# Patient Record
Sex: FEMALE | Race: WHITE | NOT HISPANIC OR LATINO | URBAN - METROPOLITAN AREA
[De-identification: names, ages, dates, MRNs, and addresses within clinical notes are randomized per-mention and may not be internally consistent; named-entity substitution may affect disease eponyms.]

---

## 2018-03-27 ENCOUNTER — EMERGENCY (EMERGENCY)
Facility: HOSPITAL | Age: 48
LOS: 1 days | Discharge: HOME | End: 2018-03-27
Admitting: EMERGENCY MEDICINE
Payer: COMMERCIAL

## 2018-03-27 VITALS
HEART RATE: 85 BPM | SYSTOLIC BLOOD PRESSURE: 105 MMHG | RESPIRATION RATE: 16 BRPM | DIASTOLIC BLOOD PRESSURE: 83 MMHG | OXYGEN SATURATION: 100 % | TEMPERATURE: 98 F

## 2018-03-27 PROCEDURE — 99283 EMERGENCY DEPT VISIT LOW MDM: CPT

## 2018-03-27 RX ORDER — DIAZEPAM 5 MG
1 TABLET ORAL
Qty: 21 | Refills: 0 | OUTPATIENT
Start: 2018-03-27 | End: 2018-04-02

## 2018-03-27 RX ORDER — ACETAMINOPHEN 500 MG
650 TABLET ORAL ONCE
Qty: 0 | Refills: 0 | Status: COMPLETED | OUTPATIENT
Start: 2018-03-27 | End: 2018-03-27

## 2018-03-27 RX ADMIN — Medication 650 MILLIGRAM(S): at 22:07

## 2018-03-27 NOTE — ED PROVIDER NOTE - PLAN OF CARE
Follow up with your PMD within 48-72 hours. Take Tylenol 650mg every 4-6 hours as needed. Take Valium 5mg every 8hrs as needed for muscle spasm- caution drowsiness when taking this medication. Do not drive or operate heavy machinery. Proceed with the MRI as scheduled and follow up with your Oncologist within the week.  Worsening, continued or ANY new concerning symptoms return to the emergency department.

## 2018-03-27 NOTE — ED PROVIDER NOTE - MEDICAL DECISION MAKING DETAILS
48 y/o F with h/o Breast CA presenting with intermittent headaches tension like in nature scheduled for an MRI this Monday, no Neuro deficits, no evidence of intracranial bleed or stroke- Valium, Tylenol, Warm compresses to shoulder/neck, reassess, Oncology/PMD follow up

## 2018-03-27 NOTE — ED PROVIDER NOTE - PROGRESS NOTE DETAILS
EBONI Liu- pt feeling much better s/p Valium and Tylenol. States she feels tension relieved in the back of her neck and shoulders which lessoned the headache.  Discussing the risks and benefits of a CT head with radiation exposure- based on a normal Neurological exam, improvement s/p meds and an MRI already scheduled within the week, through shared decision making a CT head not ordered. Patient understands that she can come back to the ED at any time for continued, worsening or new concerning symptoms.

## 2018-03-27 NOTE — ED ADULT TRIAGE NOTE - CHIEF COMPLAINT QUOTE
Pt. c/o headaches x 3 weeks is scheduled for MRI on Monday to r/o Brain mets. Hx of b/l Breast CA in 2015 that went in to remission. Denies visual changes or dizziness.

## 2018-03-27 NOTE — ED PROVIDER NOTE - OBJECTIVE STATEMENT
46 y/o F with h/o Breast CA (remission) presenting with intermittent headaches x 3 weeks. Describes them as bandlike, aching starting from shoulders into neck into head wrapping around.  Denies fever, chills, visual change, trouble walking, trouble speaking, nausea, vomiting, extremity weakness, numbness. States she is under a lot of stress at work. Saw her Oncologist Adam Young who ordered an MRI brain to r/o mets- scheduled for Monday. patient came to ED hoping she could get it here sooner.  Taking Advil, Excedrin, and Tylenol with mild relief. Appears very well. No distress.   meds: Synthroid  NKDA  (+) former smoker quit 2013 46 y/o F with h/o Breast CA (remission) presenting with intermittent headaches x 3 weeks. Describes them as bandlike, aching starting from shoulders into neck into head wrapping around.  Denies fever, chills, visual change, trouble walking, trouble speaking, nausea, vomiting, extremity weakness, numbness. States she is under a lot of stress at work. Saw her Oncologist Adam Young who ordered an MRI brain to r/o mets- scheduled for this Monday. Patient came to ED hoping she could get it here sooner. States the pain is not worse than it has been. Taking Advil, Excedrin, and Tylenol with mild relief. Appears very well. No distress.   meds: Synthroid  NKDA  (+) former smoker quit 2013

## 2018-03-27 NOTE — ED PROVIDER NOTE - CARE PLAN
Principal Discharge DX:	Headache  Assessment and plan of treatment:	Follow up with your PMD within 48-72 hours. Take Tylenol 650mg every 4-6 hours as needed. Take Valium 5mg every 8hrs as needed for muscle spasm- caution drowsiness when taking this medication. Do not drive or operate heavy machinery. Proceed with the MRI as scheduled and follow up with your Oncologist within the week.  Worsening, continued or ANY new concerning symptoms return to the emergency department.

## 2018-09-29 ENCOUNTER — EMERGENCY (EMERGENCY)
Facility: HOSPITAL | Age: 48
LOS: 1 days | Discharge: HOME | End: 2018-09-29
Attending: EMERGENCY MEDICINE | Admitting: EMERGENCY MEDICINE
Payer: COMMERCIAL

## 2018-09-29 VITALS
HEART RATE: 70 BPM | SYSTOLIC BLOOD PRESSURE: 109 MMHG | RESPIRATION RATE: 18 BRPM | DIASTOLIC BLOOD PRESSURE: 68 MMHG | OXYGEN SATURATION: 96 % | TEMPERATURE: 98 F

## 2018-09-29 VITALS
SYSTOLIC BLOOD PRESSURE: 110 MMHG | DIASTOLIC BLOOD PRESSURE: 82 MMHG | OXYGEN SATURATION: 95 % | RESPIRATION RATE: 16 BRPM | TEMPERATURE: 98 F | HEART RATE: 78 BPM

## 2018-09-29 PROBLEM — C50.919 MALIGNANT NEOPLASM OF UNSPECIFIED SITE OF UNSPECIFIED FEMALE BREAST: Chronic | Status: ACTIVE | Noted: 2018-03-27

## 2018-09-29 LAB
ALBUMIN SERPL ELPH-MCNC: 4.4 G/DL — SIGNIFICANT CHANGE UP (ref 3.3–5)
ALP SERPL-CCNC: 77 U/L — SIGNIFICANT CHANGE UP (ref 40–120)
ALT FLD-CCNC: 33 U/L — SIGNIFICANT CHANGE UP (ref 4–33)
AST SERPL-CCNC: 35 U/L — HIGH (ref 4–32)
BASE EXCESS BLDV CALC-SCNC: 1.3 MMOL/L — SIGNIFICANT CHANGE UP
BASOPHILS # BLD AUTO: 0.03 K/UL — SIGNIFICANT CHANGE UP (ref 0–0.2)
BASOPHILS NFR BLD AUTO: 0.4 % — SIGNIFICANT CHANGE UP (ref 0–2)
BILIRUB SERPL-MCNC: 0.6 MG/DL — SIGNIFICANT CHANGE UP (ref 0.2–1.2)
BLOOD GAS VENOUS - CREATININE: 0.69 MG/DL — SIGNIFICANT CHANGE UP (ref 0.5–1.3)
BUN SERPL-MCNC: 11 MG/DL — SIGNIFICANT CHANGE UP (ref 7–23)
CALCIUM SERPL-MCNC: 9.4 MG/DL — SIGNIFICANT CHANGE UP (ref 8.4–10.5)
CHLORIDE BLDV-SCNC: 108 MMOL/L — SIGNIFICANT CHANGE UP (ref 96–108)
CHLORIDE SERPL-SCNC: 102 MMOL/L — SIGNIFICANT CHANGE UP (ref 98–107)
CO2 SERPL-SCNC: 22 MMOL/L — SIGNIFICANT CHANGE UP (ref 22–31)
CREAT SERPL-MCNC: 0.78 MG/DL — SIGNIFICANT CHANGE UP (ref 0.5–1.3)
EOSINOPHIL # BLD AUTO: 0.13 K/UL — SIGNIFICANT CHANGE UP (ref 0–0.5)
EOSINOPHIL NFR BLD AUTO: 1.9 % — SIGNIFICANT CHANGE UP (ref 0–6)
GAS PNL BLDV: 138 MMOL/L — SIGNIFICANT CHANGE UP (ref 136–146)
GLUCOSE BLDV-MCNC: 86 — SIGNIFICANT CHANGE UP (ref 70–99)
GLUCOSE SERPL-MCNC: 84 MG/DL — SIGNIFICANT CHANGE UP (ref 70–99)
HCO3 BLDV-SCNC: 25 MMOL/L — SIGNIFICANT CHANGE UP (ref 20–27)
HCT VFR BLD CALC: 40.7 % — SIGNIFICANT CHANGE UP (ref 34.5–45)
HCT VFR BLDV CALC: 43.6 % — SIGNIFICANT CHANGE UP (ref 34.5–45)
HGB BLD-MCNC: 13.4 G/DL — SIGNIFICANT CHANGE UP (ref 11.5–15.5)
HGB BLDV-MCNC: 14.2 G/DL — SIGNIFICANT CHANGE UP (ref 11.5–15.5)
IMM GRANULOCYTES # BLD AUTO: 0.02 # — SIGNIFICANT CHANGE UP
IMM GRANULOCYTES NFR BLD AUTO: 0.3 % — SIGNIFICANT CHANGE UP (ref 0–1.5)
LACTATE BLDV-MCNC: 1.4 MMOL/L — SIGNIFICANT CHANGE UP (ref 0.5–2)
LIDOCAIN IGE QN: 24.4 U/L — SIGNIFICANT CHANGE UP (ref 7–60)
LYMPHOCYTES # BLD AUTO: 1.72 K/UL — SIGNIFICANT CHANGE UP (ref 1–3.3)
LYMPHOCYTES # BLD AUTO: 25 % — SIGNIFICANT CHANGE UP (ref 13–44)
MCHC RBC-ENTMCNC: 27.2 PG — SIGNIFICANT CHANGE UP (ref 27–34)
MCHC RBC-ENTMCNC: 32.9 % — SIGNIFICANT CHANGE UP (ref 32–36)
MCV RBC AUTO: 82.7 FL — SIGNIFICANT CHANGE UP (ref 80–100)
MONOCYTES # BLD AUTO: 0.41 K/UL — SIGNIFICANT CHANGE UP (ref 0–0.9)
MONOCYTES NFR BLD AUTO: 6 % — SIGNIFICANT CHANGE UP (ref 2–14)
NEUTROPHILS # BLD AUTO: 4.57 K/UL — SIGNIFICANT CHANGE UP (ref 1.8–7.4)
NEUTROPHILS NFR BLD AUTO: 66.4 % — SIGNIFICANT CHANGE UP (ref 43–77)
NRBC # FLD: 0 — SIGNIFICANT CHANGE UP
PCO2 BLDV: 45 MMHG — SIGNIFICANT CHANGE UP (ref 41–51)
PH BLDV: 7.38 PH — SIGNIFICANT CHANGE UP (ref 7.32–7.43)
PLATELET # BLD AUTO: 189 K/UL — SIGNIFICANT CHANGE UP (ref 150–400)
PMV BLD: 10 FL — SIGNIFICANT CHANGE UP (ref 7–13)
PO2 BLDV: 60 MMHG — HIGH (ref 35–40)
POTASSIUM BLDV-SCNC: 3.9 MMOL/L — SIGNIFICANT CHANGE UP (ref 3.4–4.5)
POTASSIUM SERPL-MCNC: 4.4 MMOL/L — SIGNIFICANT CHANGE UP (ref 3.5–5.3)
POTASSIUM SERPL-SCNC: 4.4 MMOL/L — SIGNIFICANT CHANGE UP (ref 3.5–5.3)
PROT SERPL-MCNC: 7.3 G/DL — SIGNIFICANT CHANGE UP (ref 6–8.3)
RBC # BLD: 4.92 M/UL — SIGNIFICANT CHANGE UP (ref 3.8–5.2)
RBC # FLD: 13.4 % — SIGNIFICANT CHANGE UP (ref 10.3–14.5)
SAO2 % BLDV: 88.7 % — HIGH (ref 60–85)
SODIUM SERPL-SCNC: 139 MMOL/L — SIGNIFICANT CHANGE UP (ref 135–145)
WBC # BLD: 6.88 K/UL — SIGNIFICANT CHANGE UP (ref 3.8–10.5)
WBC # FLD AUTO: 6.88 K/UL — SIGNIFICANT CHANGE UP (ref 3.8–10.5)

## 2018-09-29 PROCEDURE — 74177 CT ABD & PELVIS W/CONTRAST: CPT | Mod: 26

## 2018-09-29 PROCEDURE — 99285 EMERGENCY DEPT VISIT HI MDM: CPT

## 2018-09-29 RX ORDER — MORPHINE SULFATE 50 MG/1
4 CAPSULE, EXTENDED RELEASE ORAL ONCE
Qty: 0 | Refills: 0 | Status: DISCONTINUED | OUTPATIENT
Start: 2018-09-29 | End: 2018-09-29

## 2018-09-29 RX ORDER — SODIUM CHLORIDE 9 MG/ML
1000 INJECTION INTRAMUSCULAR; INTRAVENOUS; SUBCUTANEOUS ONCE
Qty: 0 | Refills: 0 | Status: COMPLETED | OUTPATIENT
Start: 2018-09-29 | End: 2018-09-29

## 2018-09-29 RX ADMIN — SODIUM CHLORIDE 1000 MILLILITER(S): 9 INJECTION INTRAMUSCULAR; INTRAVENOUS; SUBCUTANEOUS at 17:51

## 2018-09-29 RX ADMIN — MORPHINE SULFATE 4 MILLIGRAM(S): 50 CAPSULE, EXTENDED RELEASE ORAL at 16:44

## 2018-09-29 RX ADMIN — SODIUM CHLORIDE 1000 MILLILITER(S): 9 INJECTION INTRAMUSCULAR; INTRAVENOUS; SUBCUTANEOUS at 16:50

## 2018-09-29 RX ADMIN — MORPHINE SULFATE 4 MILLIGRAM(S): 50 CAPSULE, EXTENDED RELEASE ORAL at 16:51

## 2018-09-29 NOTE — ED PROVIDER NOTE - OBJECTIVE STATEMENT
49 yo F with Hx diverticulitis s/o colon resection, breast Ca s/p bilateral lumpectomy s/p chemo here for 10 days of watery diarrhea and abdominal pain. denies f/c, sob, cp, n/v, dysuria.

## 2018-09-29 NOTE — ED ADULT NURSE NOTE - OBJECTIVE STATEMENT
Pt received a&ox3, c/o diarrhea/generalized weakness x 10 days, c/o mild generalized intermittent abd pain, pt denies dizziness/ha/NV/urinary symptoms, pt denies recent antibiotic use, appears comfortable and to be in NAD, 22 gauge IV placed in right hand, labs drawn and sent.

## 2018-09-29 NOTE — ED PROVIDER NOTE - MEDICAL DECISION MAKING DETAILS
Diverticulitis vs gastroenteritis vs colitis  -CT abdomen/pelvis, labs, pain control, fluids, reassess

## 2018-09-29 NOTE — ED ADULT NURSE NOTE - CHIEF COMPLAINT QUOTE
pt sent in from [address] [place] [address] for hydration and CT. pt states she has had diarrhea since the 19th. states anything she drinks or eats comes right back out. since the 21st has had episodes of dizziness. states she has lost 5lbs in 1 weeks. c.o. severe pain across abd since last night. pt has had colon resection and diverticulitis in the past. denies fever, vomiting and nausea. states diarrhea is watery and light in color. abd feels distended and "weird" to patient. denies pain at present, states it comes and goes. abd soft on palpation, denies dysuria.

## 2018-09-29 NOTE — ED ADULT TRIAGE NOTE - CHIEF COMPLAINT QUOTE
pt sent in from [address] [place] [address] for hydration and CT. pt states she has had diarrhea since the 19th. states anything she drinks or eats comes right back out. since the 21st has had episodes of dizziness. states she has lost 5lbs in 1 weeks. c.o. severe pain across abd since last night. pt has had colon resection and diverticulitis in the past. denies fever, vomiting and nausea. states diarrhea is watery and light in color. abd feels distended and "weird" to patient. denies pain at present, states it comes and goes. abd soft on palpation, denies dysuria.

## 2021-07-15 ENCOUNTER — EMERGENCY (EMERGENCY)
Facility: HOSPITAL | Age: 51
LOS: 1 days | Discharge: HOME | End: 2021-07-15
Admitting: EMERGENCY MEDICINE
Payer: COMMERCIAL

## 2021-07-15 VITALS
OXYGEN SATURATION: 98 % | RESPIRATION RATE: 18 BRPM | DIASTOLIC BLOOD PRESSURE: 68 MMHG | HEART RATE: 69 BPM | SYSTOLIC BLOOD PRESSURE: 111 MMHG

## 2021-07-15 VITALS
TEMPERATURE: 98 F | DIASTOLIC BLOOD PRESSURE: 90 MMHG | RESPIRATION RATE: 16 BRPM | SYSTOLIC BLOOD PRESSURE: 128 MMHG | HEART RATE: 74 BPM

## 2021-07-15 PROCEDURE — 99284 EMERGENCY DEPT VISIT MOD MDM: CPT

## 2021-07-15 RX ORDER — MORPHINE SULFATE 50 MG/1
4 CAPSULE, EXTENDED RELEASE ORAL ONCE
Refills: 0 | Status: DISCONTINUED | OUTPATIENT
Start: 2021-07-15 | End: 2021-07-15

## 2021-07-15 RX ORDER — SODIUM CHLORIDE 9 MG/ML
1000 INJECTION INTRAMUSCULAR; INTRAVENOUS; SUBCUTANEOUS ONCE
Refills: 0 | Status: COMPLETED | OUTPATIENT
Start: 2021-07-15 | End: 2021-07-15

## 2021-07-15 RX ADMIN — SODIUM CHLORIDE 1000 MILLILITER(S): 9 INJECTION INTRAMUSCULAR; INTRAVENOUS; SUBCUTANEOUS at 23:40

## 2021-07-15 RX ADMIN — MORPHINE SULFATE 4 MILLIGRAM(S): 50 CAPSULE, EXTENDED RELEASE ORAL at 23:40

## 2021-07-15 NOTE — ED ADULT TRIAGE NOTE - CHIEF COMPLAINT QUOTE
C/O RLQ pain starting today. diarrhea x2 weeks. + nausea denies vomiting fevers PMh breast CA Colon resection. diverticulitis

## 2021-07-15 NOTE — ED ADULT NURSE NOTE - OBJECTIVE STATEMENT
Pt received to intake rm 5 c/o R sided groin pain x few weeks. AxOx4 and ambulatory at baseline. Pt c/o R groin pain associated with nausea and few episodes of diarrhea x couple days ago. PT denies any episodes of diarrhea today, denies vomiting. Pt appears uncomfortable with movement, in NAD, respirations even/unlabored, VS reassessed. Pt denies headache, dizziness, chest pain, SOB, fever, chills, cough. PMH of breast CA s/o b/l lumpectomy, L arm precautions, arm band in place, and diverticulitis s/p colon resection. 20G IV placed to R AC, labs drawn and sent, medicated as per orders, will continue to monitor.

## 2021-07-16 ENCOUNTER — TRANSCRIPTION ENCOUNTER (OUTPATIENT)
Age: 51
End: 2021-07-16

## 2021-07-16 LAB
ALBUMIN SERPL ELPH-MCNC: 4.5 G/DL — SIGNIFICANT CHANGE UP (ref 3.3–5)
ALP SERPL-CCNC: 70 U/L — SIGNIFICANT CHANGE UP (ref 40–120)
ALT FLD-CCNC: 25 U/L — SIGNIFICANT CHANGE UP (ref 4–33)
ANION GAP SERPL CALC-SCNC: 14 MMOL/L — SIGNIFICANT CHANGE UP (ref 7–14)
APPEARANCE UR: CLEAR — SIGNIFICANT CHANGE UP
AST SERPL-CCNC: 17 U/L — SIGNIFICANT CHANGE UP (ref 4–32)
BASOPHILS # BLD AUTO: 0.07 K/UL — SIGNIFICANT CHANGE UP (ref 0–0.2)
BASOPHILS NFR BLD AUTO: 0.8 % — SIGNIFICANT CHANGE UP (ref 0–2)
BILIRUB SERPL-MCNC: <0.2 MG/DL — SIGNIFICANT CHANGE UP (ref 0.2–1.2)
BILIRUB UR-MCNC: NEGATIVE — SIGNIFICANT CHANGE UP
BLD GP AB SCN SERPL QL: NEGATIVE — SIGNIFICANT CHANGE UP
BUN SERPL-MCNC: 16 MG/DL — SIGNIFICANT CHANGE UP (ref 7–23)
CALCIUM SERPL-MCNC: 9.6 MG/DL — SIGNIFICANT CHANGE UP (ref 8.4–10.5)
CHLORIDE SERPL-SCNC: 101 MMOL/L — SIGNIFICANT CHANGE UP (ref 98–107)
CO2 SERPL-SCNC: 24 MMOL/L — SIGNIFICANT CHANGE UP (ref 22–31)
COLOR SPEC: YELLOW — SIGNIFICANT CHANGE UP
CREAT SERPL-MCNC: 0.77 MG/DL — SIGNIFICANT CHANGE UP (ref 0.5–1.3)
DIFF PNL FLD: NEGATIVE — SIGNIFICANT CHANGE UP
EOSINOPHIL # BLD AUTO: 0.28 K/UL — SIGNIFICANT CHANGE UP (ref 0–0.5)
EOSINOPHIL NFR BLD AUTO: 3.3 % — SIGNIFICANT CHANGE UP (ref 0–6)
GLUCOSE SERPL-MCNC: 117 MG/DL — HIGH (ref 70–99)
GLUCOSE UR QL: NEGATIVE — SIGNIFICANT CHANGE UP
HCT VFR BLD CALC: 42.1 % — SIGNIFICANT CHANGE UP (ref 34.5–45)
HGB BLD-MCNC: 13.8 G/DL — SIGNIFICANT CHANGE UP (ref 11.5–15.5)
IANC: 4.07 K/UL — SIGNIFICANT CHANGE UP (ref 1.5–8.5)
IMM GRANULOCYTES NFR BLD AUTO: 0.4 % — SIGNIFICANT CHANGE UP (ref 0–1.5)
KETONES UR-MCNC: NEGATIVE — SIGNIFICANT CHANGE UP
LEUKOCYTE ESTERASE UR-ACNC: NEGATIVE — SIGNIFICANT CHANGE UP
LIDOCAIN IGE QN: 50 U/L — SIGNIFICANT CHANGE UP (ref 7–60)
LYMPHOCYTES # BLD AUTO: 3.54 K/UL — HIGH (ref 1–3.3)
LYMPHOCYTES # BLD AUTO: 41.5 % — SIGNIFICANT CHANGE UP (ref 13–44)
MCHC RBC-ENTMCNC: 27.7 PG — SIGNIFICANT CHANGE UP (ref 27–34)
MCHC RBC-ENTMCNC: 32.8 GM/DL — SIGNIFICANT CHANGE UP (ref 32–36)
MCV RBC AUTO: 84.4 FL — SIGNIFICANT CHANGE UP (ref 80–100)
MONOCYTES # BLD AUTO: 0.55 K/UL — SIGNIFICANT CHANGE UP (ref 0–0.9)
MONOCYTES NFR BLD AUTO: 6.4 % — SIGNIFICANT CHANGE UP (ref 2–14)
NEUTROPHILS # BLD AUTO: 4.07 K/UL — SIGNIFICANT CHANGE UP (ref 1.8–7.4)
NEUTROPHILS NFR BLD AUTO: 47.6 % — SIGNIFICANT CHANGE UP (ref 43–77)
NITRITE UR-MCNC: NEGATIVE — SIGNIFICANT CHANGE UP
NRBC # BLD: 0 /100 WBCS — SIGNIFICANT CHANGE UP
NRBC # FLD: 0 K/UL — SIGNIFICANT CHANGE UP
PH UR: 5.5 — SIGNIFICANT CHANGE UP (ref 5–8)
PLATELET # BLD AUTO: 214 K/UL — SIGNIFICANT CHANGE UP (ref 150–400)
POTASSIUM SERPL-MCNC: 4.2 MMOL/L — SIGNIFICANT CHANGE UP (ref 3.5–5.3)
POTASSIUM SERPL-SCNC: 4.2 MMOL/L — SIGNIFICANT CHANGE UP (ref 3.5–5.3)
PROT SERPL-MCNC: 7 G/DL — SIGNIFICANT CHANGE UP (ref 6–8.3)
PROT UR-MCNC: NEGATIVE — SIGNIFICANT CHANGE UP
RBC # BLD: 4.99 M/UL — SIGNIFICANT CHANGE UP (ref 3.8–5.2)
RBC # FLD: 13.2 % — SIGNIFICANT CHANGE UP (ref 10.3–14.5)
RH IG SCN BLD-IMP: POSITIVE — SIGNIFICANT CHANGE UP
SODIUM SERPL-SCNC: 139 MMOL/L — SIGNIFICANT CHANGE UP (ref 135–145)
SP GR SPEC: 1.02 — SIGNIFICANT CHANGE UP (ref 1.01–1.02)
UROBILINOGEN FLD QL: SIGNIFICANT CHANGE UP
WBC # BLD: 8.54 K/UL — SIGNIFICANT CHANGE UP (ref 3.8–10.5)
WBC # FLD AUTO: 8.54 K/UL — SIGNIFICANT CHANGE UP (ref 3.8–10.5)

## 2021-07-16 PROCEDURE — 74177 CT ABD & PELVIS W/CONTRAST: CPT | Mod: 26

## 2021-07-16 NOTE — ED PROVIDER NOTE - PROGRESS NOTE DETAILS
EBONI Silveira: labs reviewed, no acute abnormal findings. CT negative for acute findings. patient informed of findings. advised to f/u with PCP. return precautions discussed.

## 2021-07-16 NOTE — ED PROVIDER NOTE - PATIENT PORTAL LINK FT
You can access the FollowMyHealth Patient Portal offered by [facility] by registering at the following website: [email or web address]. By joining [facility]’s FollowMyHealth portal, you will also be able to view your health information using other applications (apps) compatible with our system.

## 2021-07-16 NOTE — ED PROVIDER NOTE - OBJECTIVE STATEMENT
50 yo F w/ hx of diverticulitis, colon resection, breast Ca presenting with c/o RLQ pain associated with diarrhea. x few days. She denies fever, chills, n/v, chest pain, dysuria, hematuria denies f/c, sob, cp, n/v, dysuria.

## 2021-07-16 NOTE — ED PROVIDER NOTE - CLINICAL SUMMARY MEDICAL DECISION MAKING FREE TEXT BOX
patient w/ Hx diverticulitis colon resection, breast Ca s/p bilateral lumpectomy  diarrhea and right lower abdominal pain, patient well appearing, vitals stable, will obtain routine labs, analgesic, CT a/p

## 2021-07-19 LAB
CULTURE RESULTS: SIGNIFICANT CHANGE UP
SPECIMEN SOURCE: SIGNIFICANT CHANGE UP

## 2023-09-20 NOTE — ED ADULT NURSE NOTE - NS PRO PASSIVE SMOKE EXP
pt shows no overt s/s aspiration at bedside evaluation for swallow function. Micro aspiration and/or silent aspiration cannot be rules out.  At present, however, there is no oral-pharyngeal contraindication for regular texture diet with thin liquids.  Disc with NSg.  Will follow for continued tolerance.
Unknown

## 2024-09-09 PROBLEM — K57.92 DIVERTICULITIS OF INTESTINE, PART UNSPECIFIED, WITHOUT PERFORATION OR ABSCESS WITHOUT BLEEDING: Chronic | Status: ACTIVE | Noted: 2021-07-16

## 2024-09-11 ENCOUNTER — NON-APPOINTMENT (OUTPATIENT)
Age: 54
End: 2024-09-11

## 2024-09-12 ENCOUNTER — APPOINTMENT (OUTPATIENT)
Dept: OTOLARYNGOLOGY | Facility: CLINIC | Age: 54
End: 2024-09-12
Payer: COMMERCIAL

## 2024-09-12 ENCOUNTER — NON-APPOINTMENT (OUTPATIENT)
Age: 54
End: 2024-09-12

## 2024-09-12 VITALS
SYSTOLIC BLOOD PRESSURE: 112 MMHG | DIASTOLIC BLOOD PRESSURE: 76 MMHG | HEART RATE: 71 BPM | HEIGHT: 65 IN | BODY MASS INDEX: 33.32 KG/M2 | WEIGHT: 200 LBS

## 2024-09-12 DIAGNOSIS — J02.9 ACUTE PHARYNGITIS, UNSPECIFIED: ICD-10-CM

## 2024-09-12 DIAGNOSIS — Z86.39 PERSONAL HISTORY OF OTHER ENDOCRINE, NUTRITIONAL AND METABOLIC DISEASE: ICD-10-CM

## 2024-09-12 DIAGNOSIS — R49.0 DYSPHONIA: ICD-10-CM

## 2024-09-12 DIAGNOSIS — Z87.891 PERSONAL HISTORY OF NICOTINE DEPENDENCE: ICD-10-CM

## 2024-09-12 DIAGNOSIS — Z85.3 PERSONAL HISTORY OF MALIGNANT NEOPLASM OF BREAST: ICD-10-CM

## 2024-09-12 DIAGNOSIS — Z77.22 CONTACT WITH AND (SUSPECTED) EXPOSURE TO ENVIRONMENTAL TOBACCO SMOKE (ACUTE) (CHRONIC): ICD-10-CM

## 2024-09-12 PROCEDURE — 31575 DIAGNOSTIC LARYNGOSCOPY: CPT

## 2024-09-12 PROCEDURE — 99203 OFFICE O/P NEW LOW 30 MIN: CPT | Mod: 25

## 2024-09-12 NOTE — REVIEW OF SYSTEMS
[Throat Clearing] : throat clearing [Throat Pain] : throat pain [Cough] : cough [Joint Pain] : joint pain [Swollen Glands] : swollen glands [Negative] : Endocrine [FreeTextEntry6] : noisy breathing [FreeTextEntry1] : headaches, muscle aches

## 2024-09-12 NOTE — ASSESSMENT
[FreeTextEntry1] : Magalys Triplett presents for evaluation of throat discomfort and dysphonia, recently treated with augmentin. She has acute pharyngitis on exam. Flexible laryngoscopy was normal.  - start clindamycin x 7 days. Side effects were discussed and include but are not limited to nausea, vomiting, diarrhea, and skin rash. - start saltwater gargles TID - f/u prn

## 2024-09-12 NOTE — HISTORY OF PRESENT ILLNESS
[de-identified] : Magalys Triplett is a 55 yo female who presents for evaluation of throat pain. She notes throat pain for the past few weeks. She went to urgent care and was told she has tonsillitis. She was prescribed augmentin. She notes some improvement in pain. She denies dysphagia and had odynophagia that resolved. She notes possible change in voice but is unsure. She denies dyspnea. No recent fevers or chills. She denies recent recurrent tonsillitis.

## 2024-09-12 NOTE — PHYSICAL EXAM
[] : septum deviated to the left [Midline] : trachea located in midline position [Laryngoscopy Performed] : laryngoscopy was performed, see procedure section for findings [de-identified] : Mild erythema of bilateral tonsils and oropharynx. [Normal] : no rashes

## 2024-09-13 RX ORDER — CLINDAMYCIN HYDROCHLORIDE 300 MG/1
300 CAPSULE ORAL EVERY 8 HOURS
Qty: 21 | Refills: 0 | Status: DISCONTINUED | COMMUNITY
Start: 2024-09-12 | End: 2024-09-13

## 2024-09-13 RX ORDER — CEPHALEXIN 500 MG/1
500 CAPSULE ORAL
Qty: 20 | Refills: 0 | Status: ACTIVE | COMMUNITY
Start: 2024-09-13 | End: 1900-01-01

## 2024-09-13 RX ORDER — FLUCONAZOLE 150 MG/1
150 TABLET ORAL
Qty: 2 | Refills: 0 | Status: ACTIVE | COMMUNITY
Start: 2024-09-13 | End: 1900-01-01

## 2024-09-27 ENCOUNTER — APPOINTMENT (OUTPATIENT)
Dept: OTOLARYNGOLOGY | Facility: CLINIC | Age: 54
End: 2024-09-27

## 2024-10-15 ENCOUNTER — RESULT REVIEW (OUTPATIENT)
Age: 54
End: 2024-10-15

## 2024-10-15 ENCOUNTER — APPOINTMENT (OUTPATIENT)
Dept: ULTRASOUND IMAGING | Facility: IMAGING CENTER | Age: 54
End: 2024-10-15
Payer: COMMERCIAL

## 2024-10-15 ENCOUNTER — OUTPATIENT (OUTPATIENT)
Dept: OUTPATIENT SERVICES | Facility: HOSPITAL | Age: 54
LOS: 1 days | End: 2024-10-15
Payer: COMMERCIAL

## 2024-10-15 DIAGNOSIS — Z00.8 ENCOUNTER FOR OTHER GENERAL EXAMINATION: ICD-10-CM

## 2024-10-15 PROCEDURE — 10005 FNA BX W/US GDN 1ST LES: CPT

## 2024-10-15 PROCEDURE — 88173 CYTOPATH EVAL FNA REPORT: CPT | Mod: 26

## 2024-10-15 PROCEDURE — 88173 CYTOPATH EVAL FNA REPORT: CPT

## 2024-10-15 PROCEDURE — 81445 SO NEO GSAP 5-50DNA/DNA&RNA: CPT

## 2024-10-16 LAB — NON-GYNECOLOGICAL CYTOLOGY STUDY: SIGNIFICANT CHANGE UP

## 2024-10-17 ENCOUNTER — APPOINTMENT (OUTPATIENT)
Dept: ORTHOPEDIC SURGERY | Facility: CLINIC | Age: 54
End: 2024-10-17

## 2024-10-17 DIAGNOSIS — M47.816 SPONDYLOSIS W/OUT MYELOPATHY OR RADICULOPATHY, LUMBAR REGION: ICD-10-CM

## 2024-10-17 DIAGNOSIS — M54.16 RADICULOPATHY, LUMBAR REGION: ICD-10-CM

## 2024-10-17 PROCEDURE — 72170 X-RAY EXAM OF PELVIS: CPT

## 2024-10-17 PROCEDURE — 99204 OFFICE O/P NEW MOD 45 MIN: CPT

## 2024-10-17 PROCEDURE — 72100 X-RAY EXAM L-S SPINE 2/3 VWS: CPT

## 2024-10-17 RX ORDER — MELOXICAM 15 MG/1
15 TABLET ORAL
Qty: 30 | Refills: 0 | Status: ACTIVE | COMMUNITY
Start: 2024-10-17 | End: 1900-01-01

## 2024-10-17 RX ORDER — METHOCARBAMOL 750 MG/1
750 TABLET, FILM COATED ORAL 3 TIMES DAILY
Qty: 30 | Refills: 0 | Status: ACTIVE | COMMUNITY
Start: 2024-10-17 | End: 1900-01-01

## 2024-10-23 LAB
BLOCK/SPECIMEN ID: SIGNIFICANT CHANGE UP
BRAF GENE MUT ANL BLD/T: SIGNIFICANT CHANGE UP
HRAS GENE MUT ANL BLD/T: SIGNIFICANT CHANGE UP
KRAS GENE MUT ANL BLD/T: SIGNIFICANT CHANGE UP
NRAS GENE MUT ANL BLD/T: SIGNIFICANT CHANGE UP
PAX8/PPARG: SIGNIFICANT CHANGE UP
RET/PTC1: SIGNIFICANT CHANGE UP
RET/PTC3: SIGNIFICANT CHANGE UP
SPECIMEN SOURCE: SIGNIFICANT CHANGE UP

## 2024-12-19 ENCOUNTER — APPOINTMENT (OUTPATIENT)
Dept: OTOLARYNGOLOGY | Facility: CLINIC | Age: 54
End: 2024-12-19

## 2025-07-27 ENCOUNTER — NON-APPOINTMENT (OUTPATIENT)
Age: 55
End: 2025-07-27